# Patient Record
Sex: FEMALE | Race: WHITE
[De-identification: names, ages, dates, MRNs, and addresses within clinical notes are randomized per-mention and may not be internally consistent; named-entity substitution may affect disease eponyms.]

---

## 2021-03-24 ENCOUNTER — HOSPITAL ENCOUNTER (OUTPATIENT)
Dept: HOSPITAL 63 - RAD | Age: 31
End: 2021-03-24
Payer: OTHER GOVERNMENT

## 2021-03-24 DIAGNOSIS — S92.811A: Primary | ICD-10-CM
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
## 2021-03-24 PROCEDURE — 73630 X-RAY EXAM OF FOOT: CPT

## 2021-03-24 NOTE — RAD
3 views of the right foot without comparison for fracture of the fifth digit.



FINDINGS: There is no fracture, dislocation, or acute osseous abnormality identified. Fifth middle an
d distal phalanges are fused. Joints and soft tissues are grossly unremarkable. No significant degene
rative changes.



IMPRESSION:

1. No acute osseous abnormality.



Electronically signed by: Darius Lujan MD (3/24/2021 12:26 PM) [id number]

## 2021-04-20 ENCOUNTER — HOSPITAL ENCOUNTER (OUTPATIENT)
Dept: HOSPITAL 63 - DXRAD | Age: 31
End: 2021-04-20
Payer: OTHER GOVERNMENT

## 2021-04-20 DIAGNOSIS — M79.671: ICD-10-CM

## 2021-04-20 DIAGNOSIS — Q74.8: Primary | ICD-10-CM

## 2021-04-20 PROCEDURE — 73630 X-RAY EXAM OF FOOT: CPT

## 2021-04-20 NOTE — RAD
EXAM: RIGHT FOOT 3 VIEWS.



HISTORY: Fifth digit fracture



COMPARISON: 03/24/2021.



FINDINGS: Three views of the left foot are obtained.



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 No fractures are identified. Ot
her joint spaces and alignment are maintained. Joint spaces are maintained.



IMPRESSION:

1. 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 No fracture.



Electronically signed by: KRISHNA Miller MD (4/20/2021 11:00 AM) [id number]